# Patient Record
(demographics unavailable — no encounter records)

---

## 2025-03-24 NOTE — PHYSICAL EXAM
[No Acute Distress] : no acute distress [Normal Sclera/Conjunctiva] : normal sclera/conjunctiva [PERRL] : pupils equal round and reactive to light [EOMI] : extraocular movements intact [Normal Outer Ear/Nose] : the outer ears and nose were normal in appearance [Normal Oropharynx] : the oropharynx was normal [No Lymphadenopathy] : no lymphadenopathy [Supple] : supple [No Respiratory Distress] : no respiratory distress  [No Accessory Muscle Use] : no accessory muscle use [Clear to Auscultation] : lungs were clear to auscultation bilaterally [Normal Rate] : normal rate  [Regular Rhythm] : with a regular rhythm [Normal S1, S2] : normal S1 and S2 [No Murmur] : no murmur heard [No Edema] : there was no peripheral edema [Soft] : abdomen soft [Non Tender] : non-tender [Non-distended] : non-distended [No Masses] : no abdominal mass palpated [Normal Bowel Sounds] : normal bowel sounds [No CVA Tenderness] : no CVA  tenderness [No Joint Swelling] : no joint swelling [Grossly Normal Strength/Tone] : grossly normal strength/tone [No Rash] : no rash [Alert and Oriented x3] : oriented to person, place, and time

## 2025-03-25 NOTE — HISTORY OF PRESENT ILLNESS
[FreeTextEntry1] : ed f/u  [de-identified] : 40 year year old female presents for ED f/u. Pt was seen in the  ED three days due to evaluation of headache and generalized weakness. In the ED, pt was febrile 100.7 otherwise VSS. Sig lab with mild leukocytosis with left shift. covid/flu/RSV and preg test was negative. EKG showed NSR. Pt was given fluids and pain meds and discharged on the same day. Today, pt reports weakness has resolved but she continues to have headache that is evenly distributed and dull in nature. Negative for neck pain. No fever since ED visit. Onset was gradual that started three days ago. Endorse photo/phonophobia but denies aura. Mild relief with tylenol. denies diarrhea, runny nose, congestion, sob, chest pain, n/v, and positional headache.

## 2025-03-25 NOTE — REVIEW OF SYSTEMS
[Cough] : cough [Headache] : headache [Fever] : no fever [Chills] : no chills [Discharge] : no discharge [Vision Problems] : no vision problems [Itching] : no itching [Earache] : no earache [Hoarseness] : no hoarseness [Nasal Discharge] : no nasal discharge [Sore Throat] : no sore throat [Postnasal Drip] : no postnasal drip [Chest Pain] : no chest pain [Shortness Of Breath] : no shortness of breath [Wheezing] : no wheezing [Dyspnea on Exertion] : no dyspnea on exertion [Abdominal Pain] : no abdominal pain [Nausea] : no nausea [Diarrhea] : diarrhea [Vomiting] : no vomiting [Dysuria] : no dysuria [Hematuria] : no hematuria [Frequency] : no frequency [Muscle Pain] : no muscle pain [Skin Rash] : no skin rash [Dizziness] : no dizziness [Fainting] : no fainting

## 2025-03-25 NOTE — HISTORY OF PRESENT ILLNESS
[FreeTextEntry1] : ed f/u  [de-identified] : 40 year year old female presents for ED f/u. Pt was seen in the  ED three days due to evaluation of headache and generalized weakness. In the ED, pt was febrile 100.7 otherwise VSS. Sig lab with mild leukocytosis with left shift. covid/flu/RSV and preg test was negative. EKG showed NSR. Pt was given fluids and pain meds and discharged on the same day. Today, pt reports weakness has resolved but she continues to have headache that is evenly distributed and dull in nature. Negative for neck pain. No fever since ED visit. Onset was gradual that started three days ago. Endorse photo/phonophobia but denies aura. Mild relief with tylenol. denies diarrhea, runny nose, congestion, sob, chest pain, n/v, and positional headache.

## 2025-03-31 NOTE — PHYSICAL EXAM
[No Acute Distress] : no acute distress [Normal Sclera/Conjunctiva] : normal sclera/conjunctiva [PERRL] : pupils equal round and reactive to light [EOMI] : extraocular movements intact [Normal Outer Ear/Nose] : the outer ears and nose were normal in appearance [Normal Oropharynx] : the oropharynx was normal [No Lymphadenopathy] : no lymphadenopathy [Supple] : supple [No Respiratory Distress] : no respiratory distress  [No Accessory Muscle Use] : no accessory muscle use [Clear to Auscultation] : lungs were clear to auscultation bilaterally [Normal Rate] : normal rate  [Regular Rhythm] : with a regular rhythm [Normal S1, S2] : normal S1 and S2 [No Murmur] : no murmur heard [No Edema] : there was no peripheral edema [Soft] : abdomen soft [Non Tender] : non-tender [Non-distended] : non-distended [No Masses] : no abdominal mass palpated [Normal Bowel Sounds] : normal bowel sounds [No Joint Swelling] : no joint swelling [Grossly Normal Strength/Tone] : grossly normal strength/tone [No Rash] : no rash [Alert and Oriented x3] : oriented to person, place, and time

## 2025-04-01 NOTE — INTERPRETER SERVICES
[Interpreters_IDNumber] : [id number] [Interpreters_FullName] : Christa Pollock [TWNoteComboBox1] : Turkmen

## 2025-04-01 NOTE — REVIEW OF SYSTEMS
[Nausea] : nausea [Pain] : pain [Fever] : no fever [Chills] : no chills [Discharge] : no discharge [Redness] : no redness [Dryness] : no dryness  [Vision Problems] : no vision problems [Itching] : no itching [Earache] : no earache [Hoarseness] : no hoarseness [Nasal Discharge] : no nasal discharge [Sore Throat] : no sore throat [Postnasal Drip] : no postnasal drip [Chest Pain] : no chest pain [Shortness Of Breath] : no shortness of breath [Wheezing] : no wheezing [Cough] : no cough [Dyspnea on Exertion] : no dyspnea on exertion [Abdominal Pain] : no abdominal pain [Diarrhea] : diarrhea [Vomiting] : no vomiting [Dysuria] : no dysuria [Hematuria] : no hematuria [Frequency] : no frequency [Muscle Pain] : no muscle pain [Skin Rash] : no skin rash [Headache] : no headache [Dizziness] : no dizziness [Fainting] : no fainting

## 2025-04-01 NOTE — HISTORY OF PRESENT ILLNESS
[FreeTextEntry1] : headache f/u  [de-identified] : 40 year year old female presents for headache f/u. Last seen on 3/24/25 for ED f/u for headache which pt clinically presents as migraine w/o aura. s/p trial of Excedrin. Pt reports headache along with phonophobia/photophobia resolved, but c/o one episode of pulsating left eye pain lasting ~10 minutes which occurred yesterday. Pt also reports mild nausea but no vomiting. Denies vision changes. Overfall, pt report the symptoms are improving, especially with Excedrin.

## 2025-04-01 NOTE — HISTORY OF PRESENT ILLNESS
[FreeTextEntry1] : headache f/u  [de-identified] : 40 year year old female presents for headache f/u. Last seen on 3/24/25 for ED f/u for headache which pt clinically presents as migraine w/o aura. s/p trial of Excedrin. Pt reports headache along with phonophobia/photophobia resolved, but c/o one episode of pulsating left eye pain lasting ~10 minutes which occurred yesterday. Pt also reports mild nausea but no vomiting. Denies vision changes. Overfall, pt report the symptoms are improving, especially with Excedrin.

## 2025-04-01 NOTE — INTERPRETER SERVICES
[Interpreters_IDNumber] : [id number] [Interpreters_FullName] : Christa Pollock [TWNoteComboBox1] : Moroccan

## 2025-06-04 NOTE — INTERPRETER SERVICES
[Language Line ] : provided by [employer]   [Interpreters_IDNumber] : [id number] [Interpreters_FullName] : [id number] [TWNoteComboBox1] : [place] Verdean

## 2025-06-04 NOTE — HISTORY OF PRESENT ILLNESS
[FreeTextEntry8] : 41 yo female presenting for CSP for pap and mammogram referral.  LMP 5/28/2025. Just ended period yesterday No family hx of cervical cancer or breast cancer.

## 2025-06-04 NOTE — INTERPRETER SERVICES
[Language Line ] : provided by [employer]   [Interpreters_IDNumber] : [id number] [Interpreters_FullName] : [id number] [TWNoteComboBox1] : Slovenian

## 2025-06-04 NOTE — HISTORY OF PRESENT ILLNESS
[FreeTextEntry8] : 39 yo female presenting for CSP for pap and mammogram referral.  LMP 5/28/2025. Just ended period yesterday No family hx of cervical cancer or breast cancer.

## 2025-06-04 NOTE — PHYSICAL EXAM
[No Acute Distress] : no acute distress [Well Nourished] : well nourished [Well Developed] : well developed [Normal Appearance] : normal in appearance [No Masses] : no palpable masses [No Nipple Discharge] : no nipple discharge [No Axillary Lymphadenopathy] : no axillary lymphadenopathy [External Female Genitalia] : normal external genitalia [Vagina] : normal vaginal exam [Cervix] : normal cervix [Uterus] : uterus was normal size, without masses or tenderness [Uterine Adnexae] : normal adnexa [Chaperone Present] : A chaperone was present in the examining room during all aspects of the physical examination [FreeTextEntry1] : cervix with scant menstrual blood  [FreeTextEntry2] : Linda PCA